# Patient Record
Sex: FEMALE | Race: WHITE | Employment: OTHER | URBAN - METROPOLITAN AREA
[De-identification: names, ages, dates, MRNs, and addresses within clinical notes are randomized per-mention and may not be internally consistent; named-entity substitution may affect disease eponyms.]

---

## 2017-01-05 ENCOUNTER — TELEPHONE (OUTPATIENT)
Dept: INTERNAL MEDICINE CLINIC | Age: 52
End: 2017-01-05

## 2017-01-06 ENCOUNTER — TELEPHONE (OUTPATIENT)
Dept: INTERNAL MEDICINE CLINIC | Age: 52
End: 2017-01-06

## 2017-01-06 DIAGNOSIS — D18.03 LIVER HEMANGIOMA: Primary | ICD-10-CM

## 2017-01-06 NOTE — TELEPHONE ENCOUNTER
Ok to sched f/u mri w brittney in 3 mos then  No, carriers by def never develop disease  Children could have issues so may need to be checked at some point

## 2017-01-06 NOTE — TELEPHONE ENCOUNTER
pls call    IMPRESSIONS:        1.:      In the left medial segment of liver, in segment IVb, there is a focal lesion on,  which is well-defined measuring 1.55 cm x 1.61 cm x 1.57 cm. The enhancing  characteristics of the lesion are highly suggestive of hepatic cavernous  hemangioma. Less likely possibility is focal nodular hyperplasia. Malignant  lesion like fibrolamellar hepatocellular carcinoma, considered to be only   doubtful possibility. To ensure  stability stability, follow-up MRI of liver, without and with  gadolinium contrast, in 3 months, is recommended. 2.:    Evidence of mild to moderate fatty infiltration in liver, most pronounced in  right lobe of liver, as described. But, otherwise, there is no additional focal  lesion in liver.     Appears to have benign liver hemangioma  Will need f/u imaging in 1 year or so  Fatty liver noted also not unexpected

## 2017-01-06 NOTE — TELEPHONE ENCOUNTER
Mri ordered for March for follow up of hemangioma, she would be due to get it done in march, will send to Maye Aguiar to be sure they can schedule it that far out

## 2017-01-06 NOTE — TELEPHONE ENCOUNTER
Patient aware of message below. The radiologist recommended 3 month follow up. . Did you still want to do a year follow up? Also her HH test showed she was a carrier. . Should she be worried?

## 2017-02-02 ENCOUNTER — OFFICE VISIT (OUTPATIENT)
Dept: INTERNAL MEDICINE CLINIC | Age: 52
End: 2017-02-02

## 2017-02-02 VITALS
WEIGHT: 151 LBS | HEIGHT: 64 IN | OXYGEN SATURATION: 96 % | DIASTOLIC BLOOD PRESSURE: 80 MMHG | HEART RATE: 79 BPM | BODY MASS INDEX: 25.78 KG/M2 | TEMPERATURE: 98 F | SYSTOLIC BLOOD PRESSURE: 118 MMHG

## 2017-02-02 DIAGNOSIS — H60.501 ACUTE OTITIS EXTERNA OF RIGHT EAR, UNSPECIFIED TYPE: Primary | ICD-10-CM

## 2017-02-02 DIAGNOSIS — Z23 ENCOUNTER FOR IMMUNIZATION: ICD-10-CM

## 2017-02-02 RX ORDER — NEOMYCIN SULFATE, POLYMYXIN B SULFATE AND HYDROCORTISONE 10; 3.5; 1 MG/ML; MG/ML; [USP'U]/ML
3 SUSPENSION/ DROPS AURICULAR (OTIC) 4 TIMES DAILY
Qty: 10 ML | Refills: 0 | Status: SHIPPED | OUTPATIENT
Start: 2017-02-02 | End: 2017-02-12

## 2017-02-02 NOTE — PROGRESS NOTES
1. Have you been to the ER, urgent care clinic or hospitalized since your last visit? NO.     2. Have you seen or consulted any other health care providers outside of the [address] [place] [address] since your last visit (Include any pap smears or colon screening)? NO      Do you have an Advanced Directive? NO    Would you like information on Advanced Directives?  YES

## 2017-02-02 NOTE — PROGRESS NOTES
45 y.o. WHITE OR  female who presents for evaluation. She noted onset of right ext ear pain over a week ago. Describes some redness around the external ear and maybe slight drainage in the ear canal.  Also had fullness and dec hearing although no deep seated ear pain. No sinus throat, resp sx. She does not swin, denied any fever. A little better the last couple days    Past Medical History   Diagnosis Date    Acne     Allergic rhinitis Dr. Rona Antunez polyp 11/14     Dr Sasha Evangelista liver 01/2017     on US and mri    H/O alcohol abuse     Hypertension     Hyponatremia 06/2014     Dr Elmer Bal; possibly from overhydration and effexor    Liver hemangioma 01/2017     MRI    Transaminasemia 11/2016     hemochromatosis gene carrier    Zoster left S2-3 2012      Current Outpatient Prescriptions   Medication Sig    neomycin-polymyxin-hydrocortisone, buffered, (PEDIOTIC) 3.5-10,000-1 mg/mL-unit/mL-% otic suspension Administer 3 Drops in left ear four (4) times daily for 10 days.  benazepril (LOTENSIN) 20 mg tablet take 1 tablet by mouth once daily    carvedilol (COREG) 6.25 mg tablet take 1 tablet by mouth twice a day WITH MEALS    diazepam (VALIUM) 10 mg tablet take 1 tablet by mouth twice a day if needed    venlafaxine-SR (EFFEXOR-XR) 150 mg capsule take 1 capsule by mouth once daily    cycloSPORINE (RESTASIS) 0.05 % ophthalmic emulsion ADMINISTER 1 DROP TO BOTH EYES 2 TIMES DAILY FOR 90 DAYS     No current facility-administered medications for this visit. Allergies   Allergen Reactions    Dyazide [Triamterene-Hydrochlorothiazid] Other (comments)     hyponatremia    Norvasc [Amlodipine] Other (comments)     bloating     Visit Vitals    /80    Pulse 79    Temp 98 °F (36.7 °C) (Oral)    Ht 5' 4\" (1.626 m)    Wt 151 lb (68.5 kg)    SpO2 96%    BMI 25.92 kg/m2   left ext ear and ear canal nl.   Right external ear and ear canal with some mild erythema and tenderness, no obvious purulence, tm looked normal without erythema or afl. Shotty nontender periauricular ln. Sinuses nt, op benign, lungs cta    Assessment and plan:  1. Otitis externa. Will give cortisporin otic, call if no improvement or worsening sx  2. We also birefly discussed the results of her recent liver eval, she's a hemochromatosis gene carrier        Above conditions discussed at length and patient vocalized understanding.   All questions answered to patient satifaction

## 2017-02-02 NOTE — MR AVS SNAPSHOT
Visit Information Date & Time Provider Department Dept. Phone Encounter #  
 2/2/2017  1:30 PM Cathy Weiss MD Internist of [address] [place] [address] [phone] [id number] Your Appointments 4/6/2017  1:00 PM  
PHYSICAL with Cathy Weiss MD  
Internist of [address]) Appt Note: cpe  
 [address] [facility], [address] [address] [address]  
  
   
 [address], [address] Upcoming Health Maintenance Date Due DTaP/Tdap/Td series (1 - Tdap) 3/10/1985 PAP AKA CERVICAL CYTOLOGY 6/8/2014 COLONOSCOPY 11/6/2017 BREAST CANCER SCRN MAMMOGRAM 12/19/2018 Allergies as of 2/2/2017  Review Complete On: 12/12/2016 By: Cathy Weiss MD  
  
 Severity Noted Reaction Type Reactions Dyazide [Triamterene-hydrochlorothiazid]  03/21/2014    Other (comments)  
 hyponatremia Norvasc [Amlodipine]  03/21/2014    Other (comments) bloating Current Immunizations  Reviewed on 6/20/2012 Name Date Influenza Vaccine (Quad) PF 12/12/2016 Influenza Vaccine Whole 10/1/2010 Pneumococcal Polysaccharide (PPSV-23) 12/12/2016 Tdap 2/2/2017 Not reviewed this visit You Were Diagnosed With   
  
 Codes Comments Encounter for immunization    -  Primary ICD-10-CM: Q96 ICD-9-CM: V03.89 Vitals BP Pulse Temp Height(growth percentile) Weight(growth percentile) SpO2  
 118/80 79 98 °F (36.7 °C) (Oral) 5' 4\" (1.626 m) 151 lb (68.5 kg) 96% BMI OB Status Smoking Status 25.92 kg/m2 Ablation Never Smoker Vitals History BMI and BSA Data Body Mass Index Body Surface Area  
 25.92 kg/m 2 1.76 m 2 Preferred Pharmacy Pharmacy Name Phone [phone] [facility] SebastianFlorinda Amelia [phone] Your Updated Medication List  
  
   
This list is accurate as of: 2/2/17  1:53 PM.  Always use your most recent med list.  
  
  
  
  
 benazepril 20 mg tablet Commonly known as:  LOTENSIN  
take 1 tablet by mouth once daily  
  
 carvedilol 6.25 mg tablet Commonly known as:  COREG  
take 1 tablet by mouth twice a day WITH MEALS  
  
 cycloSPORINE 0.05 % ophthalmic emulsion Commonly known as:  RESTASIS  
ADMINISTER 1 DROP TO BOTH EYES 2 TIMES DAILY FOR 90 DAYS  
  
 diazePAM 10 mg tablet Commonly known as:  VALIUM  
take 1 tablet by mouth twice a day if needed  
  
 venlafaxine- mg capsule Commonly known as:  EFFEXOR-XR  
take 1 capsule by mouth once daily We Performed the Following OK IMMUNIZ ADMIN,1 SINGLE/COMB VAC/TOXOID [id number] CPT(R)] TETANUS, DIPHTHERIA TOXOIDS AND ACELLULAR PERTUSSIS VACCINE (TDAP), IN INDIVIDS. >=7, IM [id number] CPT(R)] To-Do List   
 03/30/2017 1:15 PM  
  Appointment with [facility] MRI RM 2 at [address] [place] [address] ([phone]) DIET RESTRICTIONS  Nothing to eat or drink 4 hours prior to study. May have water to take meds  GENERAL INSTRUCTIONS  Bring information (ID card) if you have any medically implanted devices. You will be required to lie still while the procedure is being performed. Remove any jewelry (including body piercing, hairpins) prior to MRI. If you have had a creatinine level drawn within the past 30 days, please bring most recent results to your appt. Bring any films, CD's, and reports related to your study with you on the day of your exam.  This only includes studies done outside of [address] [place] [address], [facility], [facility], and [facility]. Bring a complete list of all medications you are currently taking to include prescriptions, over-the-counter meds, herbals, vitamins & any dietary supplements. If you were given medications for claustrophobia or anxiety, please arrange to have someone drive you to your appointment. QUESTIONS  Notify the MRI Department at [phone] if you have any questions concerning your study. Introducing [facility] SERVICES! Holger Gomez introduces [employer] patient portal. Now you can access parts of your medical record, email your doctor's office, and request medication refills online. 1. In your internet browser, go to [email or web address] 2. Click on the First Time User? Click Here link in the Sign In box. You will see the New Member Sign Up page. 3. Enter your [employer] Access Code exactly as it appears below. You will not need to use this code after youve completed the sign-up process. If you do not sign up before the expiration date, you must request a new code. · [employer] Access Code: [email or web address] Expires: 3/6/2017 11:12 AM 
 
4. Enter the last four digits of your Social Security Number (xxxx) and Date of Birth (mm/dd/yyyy) as indicated and click Submit. You will be taken to the next sign-up page. 5. Create a [employer] ID. This will be your [employer] login ID and cannot be changed, so think of one that is secure and easy to remember. 6. Create a [employer] password. You can change your password at any time. 7. Enter your Password Reset Question and Answer. This can be used at a later time if you forget your password. 8. Enter your e-mail address. You will receive e-mail notification when new information is available in [address] 9. Click Sign Up. You can now view and download portions of your medical record. 10. Click the Download Summary menu link to download a portable copy of your medical information. If you have questions, please visit the Frequently Asked Questions section of the [employer] website. Remember, [employer] is NOT to be used for urgent needs. For medical emergencies, dial 911. Now available from your iPhone and Android! Please provide this summary of care documentation to your next provider. Your primary care clinician is listed as Neeta Kaminski. If you have any questions after today's visit, please call [phone].

## 2017-03-14 RX ORDER — DIAZEPAM 10 MG/1
TABLET ORAL
Qty: 60 TAB | Refills: 1 | Status: SHIPPED | OUTPATIENT
Start: 2017-03-14 | End: 2018-04-03 | Stop reason: SDUPTHER

## 2017-03-15 ENCOUNTER — TELEPHONE (OUTPATIENT)
Dept: INTERNAL MEDICINE CLINIC | Age: 52
End: 2017-03-15

## 2017-03-15 DIAGNOSIS — F10.10 EXCESSIVE DRINKING ALCOHOL: Primary | ICD-10-CM

## 2017-03-15 DIAGNOSIS — I10 ESSENTIAL HYPERTENSION: ICD-10-CM

## 2017-03-15 DIAGNOSIS — E87.1 HYPONATREMIA: ICD-10-CM

## 2017-03-15 NOTE — TELEPHONE ENCOUNTER
LM to see where pt wants her Valium called into or if she wants to p/u printed script. It's at my desk.

## 2017-03-30 ENCOUNTER — HOSPITAL ENCOUNTER (OUTPATIENT)
Age: 52
Discharge: HOME | End: 2017-03-30
Attending: INTERNAL MEDICINE
Payer: COMMERCIAL

## 2017-03-30 DIAGNOSIS — D18.03 LIVER HEMANGIOMA: ICD-10-CM

## 2017-03-30 LAB — CREAT UR-MCNC: 0.5 MG/DL (ref 0.6–1.3)

## 2017-03-30 PROCEDURE — 82565 ASSAY OF CREATININE: CPT

## 2017-03-30 PROCEDURE — A9585 GADOBUTROL INJECTION: HCPCS | Performed by: INTERNAL MEDICINE

## 2017-03-30 PROCEDURE — 74011250636 HC RX REV CODE- 250/636: Performed by: INTERNAL MEDICINE

## 2017-03-30 PROCEDURE — 74183 MRI ABD W/O CNTR FLWD CNTR: CPT

## 2017-03-30 RX ADMIN — GADOBUTROL 10 ML: 604.72 INJECTION INTRAVENOUS at 14:00

## 2017-04-03 NOTE — PROGRESS NOTES
47 y.o. WHITE OR  female who presents for RPE    Dong well on the effexor    We had discussed her drinking at the last visit but she has not cut back from 4/d unfortunately. Serologies done after her lfts were noted to jump at the last visit and negative. US showed fatty liver w questionable mass, f/u mri confirmed hamangioma    She does a 45 min cardio/core workout 4x/week, no cardiovascular complaints and her bp remains in good range    No GI or  issues. Sees Dr Lorenza Flowers for gyn care    Past Medical History:   Diagnosis Date    Acne     Allergic rhinitis Dr. Kain Huynh polyp 11/14    Dr Ronan Rubin liver 01/2017    on US and mri    H/O alcohol abuse     Hypertension     Hyponatremia 06/2014    Dr Kerrie Grissom; possibly from overhydration and effexor    Liver hemangioma 01/2017    MRI    Transaminasemia 11/2016    hemochromatosis gene carrier    Zoster left S2-3 2012      Past Surgical History:   Procedure Laterality Date    ECHO 2D ADULT  12/05    normal    ENDOSCOPY, COLON, DIAGNOSTIC      Dr. Chu Lang neg 2005; Dr Fernando Fxo polyp 11/14    HX ORTHOPAEDIC      DEXA 0.5 spine, -0.5 hip 9/13    PULMONARY FUNCTION TEST  10/05    normal    US ABD COMP  5/09    normal    VAS CAROTID DUPLEX BILATERAL  12/05    negative     Social History     Social History    Marital status:      Spouse name: N/A    Number of children: N/A    Years of education: N/A     Occupational History    Not on file.      Social History Main Topics    Smoking status: Never Smoker    Smokeless tobacco: Never Used    Alcohol use Yes      Comment: socially    Drug use: Not on file    Sexual activity: Not on file     Other Topics Concern    Not on file     Social History Narrative     Current Outpatient Prescriptions   Medication Sig    diazePAM (VALIUM) 10 mg tablet take 1 tablet by mouth twice a day if needed    benazepril (LOTENSIN) 20 mg tablet take 1 tablet by mouth once daily    carvedilol (COREG) 6.25 mg tablet take 1 tablet by mouth twice a day WITH MEALS    venlafaxine-SR (EFFEXOR-XR) 150 mg capsule take 1 capsule by mouth once daily    cycloSPORINE (RESTASIS) 0.05 % ophthalmic emulsion ADMINISTER 1 DROP TO BOTH EYES 2 TIMES DAILY FOR 90 DAYS     No current facility-administered medications for this visit. Allergies   Allergen Reactions    Dyazide [Triamterene-Hydrochlorothiazid] Other (comments)     hyponatremia    Norvasc [Amlodipine] Other (comments)     bloating     REVIEW OF SYSTEMS: gyn 2016 Dr Gurpreet Ovalle, Stout Popper 9/14? colo 11/14 Dr Pavithra Ramos - no vision change or eye pain  Oral - no mouth pain, tongue or tooth problems  Ears - no hearing loss, ear pain, fullness, no swallowing problems  Cardiac - no CP, PND, orthopnea, edema, palpitations or syncope  Chest - no breast masses  Resp - no wheezing, chronic coughing, dyspnea  GI - no heartburn, nausea, vomiting, change in bowel habits, bleeding, hemorrhoids  Urinary - no dysuria, hematuria, flank pain, urgency, frequency  Constitutional - no wt loss, night sweats, unexplained fevers  Neuro - no focal weakness, numbness, paresthesias, incoordination, ataxia, involuntary movements  Endo - no polyuria, polydipsia, nocturia, hot flashes    Visit Vitals    /80    Pulse 79    Temp 98.2 °F (36.8 °C) (Oral)    Ht 5' 4\" (1.626 m)    Wt 151 lb (68.5 kg)    SpO2 98%    BMI 25.92 kg/m2   A&O x3  Affect is appropriate. Mood stable  No apparent distress  Anicteric, no JVD, adenopathy or thyromegaly. No carotid bruits or radiated murmur  Lungs clear to auscultation, no wheezes or rales  Heart showed regular rate and rhythm. No murmur, rubs, gallops  Abdomen soft nontender, no hepatosplenomegaly or masses. Extremities without edema.   Pulses 1-2+ symmetrically    LABS  From 6/12 showed   gluc 84, cr 0.80,              ast 36,   alt 22,   chol 201, tg 35, hdl 112, ldl-c 112, wbc 5.1, hb 14.0, plt 262, ua neg, tsh 1.34  From 9/13 showed   gluc 81, cr 0.50, gfr>60, ast 38,   alt 28,   chol 199, tg 53, hdl 101, ldl-c 87,   wbc 5.7, hb 13.2, plt 314, ua neg,           na 128  From 2/14 showed   gluc 84, cr 0.58, gfr 109,              alt 32,                                  na 133  From 6/14 showed   gluc 73, cr 0.54, gfr>60, ast 20,   alt 6,     chol 229, tg 59, hdl 102, ldl-c 115,                na 130  From 7/14 showed                      tsh 1.45,            Sosm 272, Hawa 32, am diego 10.7  From 11/15 showed gluc 74, cr 0.63, gfr>60, ast 47,   alt 54,                tsh 0.92, na 130, Sosm 281, Hawa 45, U osm 359  From 12/16 showed gluc 72, cr 0.49, gfr>60, ast 202, alt 161, chol 261, tg 56, hdl 132, ldl-c 118, wbc 5.5, hb 12.8, plt 228,        tsh 0.96, na 132, ap 54,   tb 0.6  From 12/16 showed                 ast 177, alt 126,                               ap 64,   tb 0.5, fe 100, %sat 37, ferritin 653, gogo neg, ama neg, cerulo nl, hep b/c neg, hemochrom carrier    Results for orders placed or performed in visit on 29/95/89   METABOLIC PANEL, COMPREHENSIVE   Result Value Ref Range    Glucose 85 65 - 99 mg/dL    BUN 8 6 - 24 mg/dL    Creatinine 0.62 0.57 - 1.00 mg/dL    GFR est non- >59 mL/min/1.73    GFR est  >59 mL/min/1.73    BUN/Creatinine ratio 13 9 - 23    Sodium 134 134 - 144 mmol/L    Potassium 4.7 3.5 - 5.2 mmol/L    Chloride 92 (L) 96 - 106 mmol/L    CO2 25 18 - 29 mmol/L    Calcium 9.8 8.7 - 10.2 mg/dL    Protein, total 7.7 6.0 - 8.5 g/dL    Albumin 5.1 3.5 - 5.5 g/dL    GLOBULIN, TOTAL 2.6 1.5 - 4.5 g/dL    A-G Ratio 2.0 1.2 - 2.2    Bilirubin, total 0.6 0.0 - 1.2 mg/dL    Alk.  phosphatase 56 39 - 117 IU/L    AST (SGOT) 252 (H) 0 - 40 IU/L    ALT (SGPT) 188 (H) 0 - 32 IU/L     Patient Active Problem List   Diagnosis Code    Anxiety F41.9    Excessive drinking alcohol F10.10    Essential hypertension I10    Colon polyp Dr Jackson Carrasco 11/14 K63.5    Hyponatremia E87.1     Assessment and plan:  1. Hypertension. Continue current  2. Anxiety. Continue current  3. Allergies. Followup Dr. Jacobs Sender prn  4. Excessive etoh. Abstinence reiterated, suggested therapy again  5. Hyponatremia. Water restrict, f/u Dr Razia Rivera  6. Colon polyps. Fiber, f/u Dr Carmen Barbosa  7. Transaminasemia. At least 15 min discussion about alcoholic liver disease and other possible causes of transaminasemia. Will send to Dr Anand's group for opinion        RTC 10/17    Above conditions discussed at length and patient vocalized understanding.   All questions answered to patient satifaction

## 2017-04-04 LAB
ALBUMIN SERPL-MCNC: 5.1 G/DL (ref 3.5–5.5)
ALBUMIN/GLOB SERPL: 2 {RATIO} (ref 1.2–2.2)
ALP SERPL-CCNC: 56 IU/L (ref 39–117)
ALT SERPL-CCNC: 188 IU/L (ref 0–32)
AST SERPL-CCNC: 252 IU/L (ref 0–40)
BILIRUB SERPL-MCNC: 0.6 MG/DL (ref 0–1.2)
BUN SERPL-MCNC: 8 MG/DL (ref 6–24)
BUN/CREAT SERPL: 13 (ref 9–23)
CALCIUM SERPL-MCNC: 9.8 MG/DL (ref 8.7–10.2)
CHLORIDE SERPL-SCNC: 92 MMOL/L (ref 96–106)
CO2 SERPL-SCNC: 25 MMOL/L (ref 18–29)
CREAT SERPL-MCNC: 0.62 MG/DL (ref 0.57–1)
GLOBULIN SER CALC-MCNC: 2.6 G/DL (ref 1.5–4.5)
GLUCOSE SERPL-MCNC: 85 MG/DL (ref 65–99)
POTASSIUM SERPL-SCNC: 4.7 MMOL/L (ref 3.5–5.2)
PROT SERPL-MCNC: 7.7 G/DL (ref 6–8.5)
SODIUM SERPL-SCNC: 134 MMOL/L (ref 134–144)

## 2017-04-06 ENCOUNTER — OFFICE VISIT (OUTPATIENT)
Dept: INTERNAL MEDICINE CLINIC | Age: 52
End: 2017-04-06

## 2017-04-06 VITALS
BODY MASS INDEX: 25.78 KG/M2 | HEART RATE: 79 BPM | HEIGHT: 64 IN | TEMPERATURE: 98.2 F | DIASTOLIC BLOOD PRESSURE: 80 MMHG | SYSTOLIC BLOOD PRESSURE: 118 MMHG | OXYGEN SATURATION: 98 % | WEIGHT: 151 LBS

## 2017-04-06 DIAGNOSIS — F41.9 ANXIETY: ICD-10-CM

## 2017-04-06 DIAGNOSIS — K63.5 POLYP OF COLON, UNSPECIFIED PART OF COLON, UNSPECIFIED TYPE: ICD-10-CM

## 2017-04-06 DIAGNOSIS — F10.10 EXCESSIVE DRINKING ALCOHOL: ICD-10-CM

## 2017-04-06 DIAGNOSIS — R74.01 TRANSAMINASEMIA: Primary | ICD-10-CM

## 2017-04-06 DIAGNOSIS — Z00.00 PHYSICAL EXAM: ICD-10-CM

## 2017-04-06 DIAGNOSIS — I10 ESSENTIAL HYPERTENSION: ICD-10-CM

## 2017-04-06 NOTE — PROGRESS NOTES
pls get records dr Ayo Dozier (sp?)  - dr Ирина Amado' group  mammo from [facility]

## 2017-04-06 NOTE — MR AVS SNAPSHOT
Visit Information Date & Time Provider Department Dept. Phone Encounter #  
 4/6/2017  1:00 PM Bernardo Smith MD Internist of [address] [place] [address] Place [id number] Your Appointments 10/12/2017  1:30 PM  
Office Visit with Bernardo Smith MD  
Internist of Talia Diaz [facility]) Appt Note: ov [address]mos. rd  
 [address] [facility], [address] [address] [address] [place]  
  
   
 [address] [facility], [address] Upcoming Health Maintenance Date Due  
 PAP AKA CERVICAL CYTOLOGY 6/8/2014 COLONOSCOPY 11/6/2017 BREAST CANCER SCRN MAMMOGRAM 12/19/2018 DTaP/Tdap/Td series (2 - Td) 2/2/2027 Allergies as of 4/6/2017  Review Complete On: 2/2/2017 By: Bernardo Smith MD  
  
 Severity Noted Reaction Type Reactions Dyazide [Triamterene-hydrochlorothiazid]  03/21/2014    Other (comments)  
 hyponatremia Norvasc [Amlodipine]  03/21/2014    Other (comments) bloating Current Immunizations  Reviewed on 6/20/2012 Name Date Influenza Vaccine (Quad) PF 12/12/2016 Influenza Vaccine Whole 10/1/2010 Pneumococcal Polysaccharide (PPSV-23) 12/12/2016 Tdap 2/2/2017 Not reviewed this visit Vitals BP Pulse Temp Height(growth percentile) Weight(growth percentile) SpO2  
 118/80 79 98.2 °F (36.8 °C) (Oral) 5' 4\" (1.626 m) 151 lb (68.5 kg) 98% BMI OB Status Smoking Status 25.92 kg/m2 Ablation Never Smoker Vitals History BMI and BSA Data Body Mass Index Body Surface Area  
 25.92 kg/m 2 1.76 m 2 Preferred Pharmacy Pharmacy Name Phone [address] [phone] Your Updated Medication List  
  
   
This list is accurate as of: 4/6/17  1:50 PM.  Always use your most recent med list.  
  
  
  
  
 benazepril 20 mg tablet Commonly known as:  LOTENSIN  
take 1 tablet by mouth once daily  
  
 carvedilol 6.25 mg tablet Commonly known as:  COREG  
take 1 tablet by mouth twice a day WITH MEALS  
  
 cycloSPORINE 0.05 % ophthalmic emulsion Commonly known as:  RESTASIS  
ADMINISTER 1 DROP TO BOTH EYES 2 TIMES DAILY FOR 90 DAYS  
  
 diazePAM 10 mg tablet Commonly known as:  VALIUM  
take 1 tablet by mouth twice a day if needed  
  
 venlafaxine- mg capsule Commonly known as:  EFFEXOR-XR  
take 1 capsule by mouth once daily Introducing [facility] SERVICES! Ambreen Santo introduces [employer] patient portal. Now you can access parts of your medical record, email your doctor's office, and request medication refills online. 1. In your internet browser, go to [email or web address] 2. Click on the First Time User? Click Here link in the Sign In box. You will see the New Member Sign Up page. 3. Enter your [employer] Access Code exactly as it appears below. You will not need to use this code after youve completed the sign-up process. If you do not sign up before the expiration date, you must request a new code. · [employer] Access Code: [email or web address] Expires: 6/18/2017  2:55 PM 
 
4. Enter the last four digits of your Social Security Number (xxxx) and Date of Birth (mm/dd/yyyy) as indicated and click Submit. You will be taken to the next sign-up page. 5. Create a [employer] ID. This will be your [employer] login ID and cannot be changed, so think of one that is secure and easy to remember. 6. Create a [employer] password. You can change your password at any time. 7. Enter your Password Reset Question and Answer. This can be used at a later time if you forget your password. 8. Enter your e-mail address. You will receive e-mail notification when new information is available in [address] 9. Click Sign Up. You can now view and download portions of your medical record. 10. Click the Download Summary menu link to download a portable copy of your medical information. If you have questions, please visit the Frequently Asked Questions section of the [employer] website. Remember, [facility] is NOT to be used for urgent needs. For medical emergencies, dial 911. Now available from your iPhone and Android! Please provide this summary of care documentation to your next provider. Your primary care clinician is listed as Nataliia Infante. If you have any questions after today's visit, please call [phone].

## 2017-04-06 NOTE — PROGRESS NOTES
1. Have you been to the ER, urgent care clinic or hospitalized since your last visit? NO.     2. Have you seen or consulted any other health care providers outside of the [address] [place] [address] since your last visit (Include any pap smears or colon screening)? NO      Do you have an Advanced Directive? NO    Would you like information on Advanced Directives?  YES

## 2017-04-12 ENCOUNTER — TELEPHONE (OUTPATIENT)
Dept: INTERNAL MEDICINE CLINIC | Age: 52
End: 2017-04-12

## 2017-04-12 NOTE — TELEPHONE ENCOUNTER
Dr. Jewels Reynoso office calling saying they did not get fax with office note yesterday.     Please re fax to [phone]

## 2017-10-10 ENCOUNTER — TELEPHONE (OUTPATIENT)
Dept: INTERNAL MEDICINE CLINIC | Age: 52
End: 2017-10-10

## 2017-10-10 NOTE — TELEPHONE ENCOUNTER
----- Message from Steve Garrett MD sent at 10/9/2017 10:20 PM EDT -----  Records marie sevilla/rachel romero if she was seen in July

## 2017-10-10 NOTE — PROGRESS NOTES
Error          LABS  From 6/12 showed   gluc 84, cr 0.80,              ast 36,   alt 22,   chol 201, tg 35, hdl 112, ldl-c 112, wbc 5.1, hb 14.0, plt 262, ua neg, tsh 1.34  From 9/13 showed   gluc 81, cr 0.50, gfr>60, ast 38,   alt 28,   chol 199, tg 53, hdl 101, ldl-c 87,   wbc 5.7, hb 13.2, plt 314, ua neg,           na 128  From 2/14 showed   gluc 84, cr 0.58, gfr 109,              alt 32,                                  na 133  From 6/14 showed   gluc 73, cr 0.54, gfr>60, ast 20,   alt 6,     chol 229, tg 59, hdl 102, ldl-c 115,                na 130  From 7/14 showed                      tsh 1.45,            Sosm 272, Hawa 32, am diego 10.7  From 11/15 showed gluc 74, cr 0.63, gfr>60, ast 47,   alt 54,                tsh 0.92, na 130, Sosm 281, Hawa 45, U osm 359  From 12/16 showed gluc 72, cr 0.49, gfr>60, ast 202, alt 161, chol 261, tg 56, hdl 132, ldl-c 118, wbc 5.5, hb 12.8, plt 228,        tsh 0.96, na 132, ap 54,   tb 0.6  From 12/16 showed                 ast 177, alt 126,                               ap 64,   tb 0.5, fe 100, %sat 37, ferritin 653, gogo neg, ama neg, cerulo nl, hep b/c neg, hemochrom carrier  From 4/17 showed gluc 85, cr 0.62, gfr 103, ast 252, alt 188, ap 56, tb 0.6     Assessment and plan:  1. Hypertension. Continue current  2. Anxiety. Continue current  3. Allergies. Followup Dr. Mary Rivera prn  4. Excessive etoh. Abstinence reiterated, suggested therapy again  5. Hyponatremia. Water restrict, f/u Dr Rubia Almazan  6. Colon polyps. Fiber, f/u Dr Suresh Roman  7. Transaminasemia. At least 15 min discussion about alcoholic liver disease and other possible causes of transaminasemia. Will send to Dr Anand's group for opinion        RTC 10/17    Above conditions discussed at length and patient vocalized understanding.   All questions answered to patient satifaction

## 2017-10-11 RX ORDER — CARVEDILOL 6.25 MG/1
TABLET ORAL
Qty: 180 TAB | Refills: 3 | Status: SHIPPED | OUTPATIENT
Start: 2017-10-11 | End: 2018-10-16 | Stop reason: SDUPTHER

## 2017-10-12 ENCOUNTER — OFFICE VISIT (OUTPATIENT)
Dept: INTERNAL MEDICINE CLINIC | Age: 52
End: 2017-10-12

## 2017-11-07 ENCOUNTER — DOCUMENTATION ONLY (OUTPATIENT)
Dept: INTERNAL MEDICINE CLINIC | Age: 52
End: 2017-11-07

## 2017-11-07 NOTE — PROGRESS NOTES
Received via fax request release from [facility] (alcohol/drug addiction treatment center) for all records due to patient being in their care. I have faxed it to [facility] as Urgent request for processing.

## 2017-12-18 RX ORDER — BENAZEPRIL HYDROCHLORIDE 20 MG/1
TABLET ORAL
Qty: 90 TAB | Refills: 3 | Status: SHIPPED | OUTPATIENT
Start: 2017-12-18 | End: 2018-12-18 | Stop reason: SDUPTHER

## 2018-02-20 NOTE — PATIENT DISCUSSION
DRY EYES : Discussed with patient the importance of keeping the eye moist and the symptoms associated with dry eyes including blurry vision, tearing, burning, and dane sensation. Advised patient to minimize use of any fans blowing directly on the face. Advised patient to continue with artificial tears 2-3 times daily.

## 2018-03-01 NOTE — PATIENT DISCUSSION
New Prescription: Ocuflox (ofloxacin): drops: 0.3% 1 drop four times a day as directed into affected eye 03-

## 2018-03-01 NOTE — PATIENT DISCUSSION
New Prescription: Acular (ketorolac): drops: 0.5% 1 drop four times a day as directed into affected eye 03-

## 2018-03-01 NOTE — PATIENT DISCUSSION
New Prescription: Pred Forte (prednisolone acetate): drops,suspension: 1% 1 drop four times a day as directed into affected eye 03-

## 2018-03-15 NOTE — PATIENT DISCUSSION
Continue: Ocuflox (ofloxacin): drops: 0.3% 1 drop four times a day as directed into affected eye 03-

## 2018-03-15 NOTE — PATIENT DISCUSSION
Continue: Pred Forte (prednisolone acetate): drops,suspension: 1% 1 drop four times a day as directed into affected eye 03-

## 2018-04-04 ENCOUNTER — TELEPHONE (OUTPATIENT)
Dept: INTERNAL MEDICINE CLINIC | Age: 53
End: 2018-04-04

## 2018-10-16 RX ORDER — CARVEDILOL 6.25 MG/1
TABLET ORAL
Qty: 180 TAB | Refills: 3 | Status: SHIPPED | OUTPATIENT
Start: 2018-10-16 | End: 2020-07-11

## 2018-12-18 NOTE — LETTER
12/21/2018 9:39 AM 
 
Ms. Mary Garcia [address] [facility] [address] Dear Ms. Dax: 
 
We've missed you! Please call our office at [phone] and schedule a follow up appointment for your continued care. Sincerely, Ariel Powell MD

## 2018-12-19 DIAGNOSIS — F43.9 STRESS: ICD-10-CM

## 2018-12-19 RX ORDER — BENAZEPRIL HYDROCHLORIDE 20 MG/1
TABLET ORAL
Qty: 90 TAB | Refills: 0 | Status: SHIPPED | OUTPATIENT
Start: 2018-12-19 | End: 2019-03-25 | Stop reason: SDUPTHER

## 2018-12-21 RX ORDER — DIAZEPAM 10 MG/1
TABLET ORAL
Qty: 60 TAB | Refills: 1 | Status: ON HOLD | OUTPATIENT
Start: 2018-12-21 | End: 2019-05-31 | Stop reason: SDUPTHER

## 2018-12-21 NOTE — TELEPHONE ENCOUNTER
Last ov 10/12/17  Last fill 4/4/18      Reviewed report generated by the [address] [place] [address]. Does not demonstrate aberrancies or inconsistencies with regard to the prescribing of controlled medications to this patient by other providers.

## 2019-03-25 RX ORDER — BENAZEPRIL HYDROCHLORIDE 20 MG/1
TABLET ORAL
Qty: 90 TAB | Refills: 3 | Status: SHIPPED | OUTPATIENT
Start: 2019-03-25 | End: 2020-07-11

## 2019-05-29 PROBLEM — R04.0 EPISTAXIS: Status: ACTIVE | Noted: 2019-05-29

## 2019-05-29 PROBLEM — R55 VASOVAGAL NEAR SYNCOPE: Status: ACTIVE | Noted: 2019-05-29

## 2019-05-29 PROBLEM — K92.1 MELENA: Status: ACTIVE | Noted: 2019-05-29

## 2019-10-14 ENCOUNTER — DOCUMENTATION ONLY (OUTPATIENT)
Dept: INTERNAL MEDICINE CLINIC | Age: 54
End: 2019-10-14

## 2019-10-14 RX ORDER — CARVEDILOL 6.25 MG/1
TABLET ORAL
Qty: 180 TAB | Refills: 3 | OUTPATIENT
Start: 2019-10-14

## 2020-07-08 PROBLEM — R10.9 ABDOMINAL PAIN: Status: ACTIVE | Noted: 2020-07-08

## 2020-07-08 PROBLEM — E87.5 HYPERKALEMIA: Status: ACTIVE | Noted: 2020-07-08

## 2020-07-08 PROBLEM — R55 NEAR SYNCOPE: Status: ACTIVE | Noted: 2020-07-08

## 2020-07-08 PROBLEM — R10.11 RIGHT UPPER QUADRANT ABDOMINAL PAIN: Status: ACTIVE | Noted: 2020-07-08

## 2020-07-08 PROBLEM — I95.9 HYPOTENSION: Status: ACTIVE | Noted: 2020-07-08

## 2020-08-01 NOTE — TELEPHONE ENCOUNTER
No new care gaps identified.  Powered by [employer]. Reference number: [id number]. 8/01/2020 10:54:15 AM   AM[place]OT   Pt aware of message below, she verbalized understanding.  Labs printed and will mail to her

## 2020-08-03 PROBLEM — R18.8 ASCITES: Status: ACTIVE | Noted: 2020-08-03

## 2020-08-03 PROBLEM — N39.0 UTI (URINARY TRACT INFECTION): Status: ACTIVE | Noted: 2020-08-03

## 2020-10-30 PROBLEM — F10.10 ALCOHOL ABUSE: Status: ACTIVE | Noted: 2020-10-30

## 2020-10-30 PROBLEM — S06.30AA TRAUMATIC INTRAVENTRICULAR HEMORRHAGE: Status: ACTIVE | Noted: 2020-10-30

## 2021-09-10 ENCOUNTER — IMPORTED ENCOUNTER (OUTPATIENT)
Dept: URBAN - METROPOLITAN AREA CLINIC 1 | Facility: CLINIC | Age: 56
End: 2021-09-10

## 2021-09-10 PROBLEM — H52.221: Noted: 2021-09-10

## 2021-09-10 PROBLEM — H52.03: Noted: 2021-09-10

## 2021-09-10 PROBLEM — H52.4: Noted: 2021-09-10

## 2021-09-10 PROCEDURE — S0620 ROUTINE OPHTHALMOLOGICAL EXA: HCPCS

## 2021-09-10 NOTE — PATIENT DISCUSSION
1.  Hyperopia w/ Astigmatism OD -- Rx was given for corrective spectacles if indicated. 2.  Presbyopia 3. Cataract OU -- Observe. 4.  ESTEVAN w/ PEK OU -- Recommended ATs TID OU routinely. Return for an appointment in 6 months 27 with Dr. Courtney Bautista. Return for an appointment in 1 year 36 with Dr. Courtney Bautista.

## 2022-04-02 ASSESSMENT — VISUAL ACUITY
OD_CC: J2
OS_CC: J2
OS_CC: 20/150
OD_CC: 20/80

## 2022-04-02 ASSESSMENT — TONOMETRY
OD_IOP_MMHG: 15
OS_IOP_MMHG: 16

## 2022-07-28 PROBLEM — F10.939 ALCOHOL WITHDRAWAL (HCC): Status: ACTIVE | Noted: 2022-07-28

## 2022-07-28 PROBLEM — R26.81 GAIT INSTABILITY: Status: ACTIVE | Noted: 2022-07-28

## 2022-07-28 PROBLEM — G93.40 ENCEPHALOPATHY: Status: ACTIVE | Noted: 2022-07-28

## 2022-07-28 PROBLEM — F10.10 ETOH ABUSE: Status: ACTIVE | Noted: 2022-07-28

## 2022-07-28 PROBLEM — G93.40 ENCEPHALOPATHY ACUTE: Status: ACTIVE | Noted: 2022-07-28

## 2023-01-23 ENCOUNTER — COMPREHENSIVE EXAM (OUTPATIENT)
Dept: URBAN - METROPOLITAN AREA CLINIC 1 | Facility: CLINIC | Age: 58
End: 2023-01-23

## 2023-01-23 DIAGNOSIS — H52.4: ICD-10-CM

## 2023-01-23 DIAGNOSIS — H52.221: ICD-10-CM

## 2023-01-23 DIAGNOSIS — H52.03: ICD-10-CM

## 2023-01-23 DIAGNOSIS — Z01.00: ICD-10-CM

## 2023-01-23 PROCEDURE — 92015 DETERMINE REFRACTIVE STATE: CPT

## 2023-01-23 PROCEDURE — 92014 COMPRE OPH EXAM EST PT 1/>: CPT

## 2023-01-23 ASSESSMENT — VISUAL ACUITY
OD_CC: 20/30
OS_CC: 20/30

## 2023-01-23 ASSESSMENT — TONOMETRY
OS_IOP_MMHG: 14
OD_IOP_MMHG: 16